# Patient Record
Sex: FEMALE | Race: BLACK OR AFRICAN AMERICAN | NOT HISPANIC OR LATINO | Employment: FULL TIME | URBAN - METROPOLITAN AREA
[De-identification: names, ages, dates, MRNs, and addresses within clinical notes are randomized per-mention and may not be internally consistent; named-entity substitution may affect disease eponyms.]

---

## 2022-01-11 ENCOUNTER — LAB VISIT (OUTPATIENT)
Dept: LAB | Facility: HOSPITAL | Age: 27
End: 2022-01-11
Attending: SPECIALIST
Payer: MEDICAID

## 2022-01-11 ENCOUNTER — INITIAL PRENATAL (OUTPATIENT)
Dept: OBSTETRICS AND GYNECOLOGY | Facility: CLINIC | Age: 27
End: 2022-01-11
Payer: MEDICAID

## 2022-01-11 VITALS
BODY MASS INDEX: 20.23 KG/M2 | DIASTOLIC BLOOD PRESSURE: 64 MMHG | WEIGHT: 114.19 LBS | SYSTOLIC BLOOD PRESSURE: 108 MMHG

## 2022-01-11 DIAGNOSIS — Z34.90 PREGNANCY, UNSPECIFIED GESTATIONAL AGE: Primary | ICD-10-CM

## 2022-01-11 DIAGNOSIS — Z3A.18 18 WEEKS GESTATION OF PREGNANCY: ICD-10-CM

## 2022-01-11 LAB
B-HCG UR QL: POSITIVE
BASOPHILS # BLD AUTO: 0.02 K/UL (ref 0–0.2)
BASOPHILS NFR BLD: 0.3 % (ref 0–1.9)
BILIRUB SERPL-MCNC: NORMAL MG/DL
BLOOD URINE, POC: NORMAL
CLARITY, POC UA: CLEAR
COLOR, POC UA: YELLOW
CTP QC/QA: YES
DIFFERENTIAL METHOD: NORMAL
EOSINOPHIL # BLD AUTO: 0.1 K/UL (ref 0–0.5)
EOSINOPHIL NFR BLD: 2.1 % (ref 0–8)
ERYTHROCYTE [DISTWIDTH] IN BLOOD BY AUTOMATED COUNT: 12.4 % (ref 11.5–14.5)
GLUCOSE UR QL STRIP: NORMAL
HCT VFR BLD AUTO: 40.8 % (ref 37–48.5)
HGB BLD-MCNC: 13.4 G/DL (ref 12–16)
IMM GRANULOCYTES # BLD AUTO: 0.02 K/UL (ref 0–0.04)
IMM GRANULOCYTES NFR BLD AUTO: 0.3 % (ref 0–0.5)
KETONES UR QL STRIP: NORMAL
LEUKOCYTE ESTERASE URINE, POC: NORMAL
LYMPHOCYTES # BLD AUTO: 2.1 K/UL (ref 1–4.8)
LYMPHOCYTES NFR BLD: 36.1 % (ref 18–48)
MCH RBC QN AUTO: 30.2 PG (ref 27–31)
MCHC RBC AUTO-ENTMCNC: 32.8 G/DL (ref 32–36)
MCV RBC AUTO: 92 FL (ref 82–98)
MONOCYTES # BLD AUTO: 0.5 K/UL (ref 0.3–1)
MONOCYTES NFR BLD: 8.6 % (ref 4–15)
NEUTROPHILS # BLD AUTO: 3 K/UL (ref 1.8–7.7)
NEUTROPHILS NFR BLD: 52.6 % (ref 38–73)
NITRITE, POC UA: NORMAL
NRBC BLD-RTO: 0 /100 WBC
PH, POC UA: 7
PLATELET # BLD AUTO: 273 K/UL (ref 150–450)
PMV BLD AUTO: 11.9 FL (ref 9.2–12.9)
PROTEIN, POC: NORMAL
RBC # BLD AUTO: 4.44 M/UL (ref 4–5.4)
SPECIFIC GRAVITY, POC UA: NORMAL
TSH SERPL DL<=0.005 MIU/L-ACNC: 0.61 UIU/ML (ref 0.4–4)
UROBILINOGEN, POC UA: NORMAL
WBC # BLD AUTO: 5.79 K/UL (ref 3.9–12.7)

## 2022-01-11 PROCEDURE — 81025 URINE PREGNANCY TEST: CPT | Mod: PBBFAC,PN | Performed by: SPECIALIST

## 2022-01-11 PROCEDURE — 99999 PR PBB SHADOW E&M-EST. PATIENT-LVL III: ICD-10-PCS | Mod: PBBFAC,,, | Performed by: SPECIALIST

## 2022-01-11 PROCEDURE — 86762 RUBELLA ANTIBODY: CPT | Performed by: SPECIALIST

## 2022-01-11 PROCEDURE — 76805 OB US >/= 14 WKS SNGL FETUS: CPT | Mod: 26,S$PBB,, | Performed by: SPECIALIST

## 2022-01-11 PROCEDURE — 88175 CYTOPATH C/V AUTO FLUID REDO: CPT | Performed by: PATHOLOGY

## 2022-01-11 PROCEDURE — 99205 OFFICE O/P NEW HI 60 MIN: CPT | Mod: 25,TH,S$PBB, | Performed by: SPECIALIST

## 2022-01-11 PROCEDURE — 36415 COLL VENOUS BLD VENIPUNCTURE: CPT | Mod: PN | Performed by: SPECIALIST

## 2022-01-11 PROCEDURE — 88141 CYTOPATH C/V INTERPRET: CPT | Mod: ,,, | Performed by: PATHOLOGY

## 2022-01-11 PROCEDURE — 76805 PR US, OB 14+WKS, TRANSABD, SINGLE GESTATION: ICD-10-PCS | Mod: 26,S$PBB,, | Performed by: SPECIALIST

## 2022-01-11 PROCEDURE — 86592 SYPHILIS TEST NON-TREP QUAL: CPT | Performed by: SPECIALIST

## 2022-01-11 PROCEDURE — 76805 OB US >/= 14 WKS SNGL FETUS: CPT | Mod: PBBFAC,PN | Performed by: SPECIALIST

## 2022-01-11 PROCEDURE — 99213 OFFICE O/P EST LOW 20 MIN: CPT | Mod: PBBFAC,TH,PN | Performed by: SPECIALIST

## 2022-01-11 PROCEDURE — 85025 COMPLETE CBC W/AUTO DIFF WBC: CPT | Performed by: SPECIALIST

## 2022-01-11 PROCEDURE — 87624 HPV HI-RISK TYP POOLED RSLT: CPT | Performed by: SPECIALIST

## 2022-01-11 PROCEDURE — 87340 HEPATITIS B SURFACE AG IA: CPT | Performed by: SPECIALIST

## 2022-01-11 PROCEDURE — 81220 CFTR GENE COM VARIANTS: CPT | Performed by: SPECIALIST

## 2022-01-11 PROCEDURE — 87591 N.GONORRHOEAE DNA AMP PROB: CPT | Performed by: SPECIALIST

## 2022-01-11 PROCEDURE — 86901 BLOOD TYPING SEROLOGIC RH(D): CPT | Performed by: SPECIALIST

## 2022-01-11 PROCEDURE — 99999 PR PBB SHADOW E&M-EST. PATIENT-LVL III: CPT | Mod: PBBFAC,,, | Performed by: SPECIALIST

## 2022-01-11 PROCEDURE — 76810 OB US >/= 14 WKS ADDL FETUS: CPT | Mod: PBBFAC,PN | Performed by: SPECIALIST

## 2022-01-11 PROCEDURE — 81002 URINALYSIS NONAUTO W/O SCOPE: CPT | Mod: PBBFAC,PN | Performed by: SPECIALIST

## 2022-01-11 PROCEDURE — 87389 HIV-1 AG W/HIV-1&-2 AB AG IA: CPT | Performed by: SPECIALIST

## 2022-01-11 PROCEDURE — 87491 CHLMYD TRACH DNA AMP PROBE: CPT | Performed by: SPECIALIST

## 2022-01-11 PROCEDURE — 84443 ASSAY THYROID STIM HORMONE: CPT | Performed by: SPECIALIST

## 2022-01-11 PROCEDURE — 88141 PR  CYTOPATH CERV/VAG INTERPRET: ICD-10-PCS | Mod: ,,, | Performed by: PATHOLOGY

## 2022-01-11 PROCEDURE — 99205 PR OFFICE/OUTPT VISIT, NEW, LEVL V, 60-74 MIN: ICD-10-PCS | Mod: 25,TH,S$PBB, | Performed by: SPECIALIST

## 2022-01-12 LAB
ABO + RH BLD: NORMAL
BLD GP AB SCN CELLS X3 SERPL QL: NORMAL
HBV SURFACE AG SERPL QL IA: NEGATIVE
HIV 1+2 AB+HIV1 P24 AG SERPL QL IA: NEGATIVE
RPR SER QL: NORMAL
RUBV IGG SER-ACNC: 22.6 IU/ML
RUBV IGG SER-IMP: REACTIVE

## 2022-01-12 NOTE — PROGRESS NOTES
25 yo BF  presents for late initial prenatal care  Positive quickening  Unremarkable prenatal past history    Past Medical History:   Diagnosis Date    Abnormal Pap smear of cervix        History reviewed. No pertinent surgical history.    Family History   Problem Relation Age of Onset    Diabetes Maternal Grandmother     Diabetes Maternal Grandfather     Hypertension Maternal Grandfather     Stroke Maternal Grandfather     Hypertension Mother     Breast cancer Neg Hx     Cancer Neg Hx     Colon cancer Neg Hx     Eclampsia Neg Hx     Miscarriages / Stillbirths Neg Hx     Ovarian cancer Neg Hx      labor Neg Hx        Social History     Socioeconomic History    Marital status: Single   Tobacco Use    Smoking status: Never Smoker    Smokeless tobacco: Never Used   Substance and Sexual Activity    Alcohol use: Never    Drug use: Never    Sexual activity: Yes     Partners: Male       Current Outpatient Medications   Medication Sig Dispense Refill    prenatal 25/iron/folate 6/dha (PRENA1 ORAL) Take by mouth.      ondansetron (ZOFRAN-ODT) 4 MG TbDL Take 1 tablet (4 mg total) by mouth every 8 (eight) hours as needed (nausea). (Patient not taking: Reported on 2022) 30 tablet 0     No current facility-administered medications for this visit.       Review of patient's allergies indicates:  No Known Allergies    Review of System:   General: no chills, fever, night sweats, weight gain or weight loss  Psychological: no depression or suicidal ideation  Breasts: no new or changing breast lumps, nipple discharge or masses.  Respiratory: no cough, shortness of breath, or wheezing  Cardiovascular: no chest pain or dyspnea on exertion  Gastrointestinal: no abdominal pain, change in bowel habits, or black or bloody stools  Genito-Urinary: no incontinence, urinary frequency/urgency or vulvar/vaginal symptoms, pelvic pain or abnormal vaginal bleeding.  Musculoskeletal: no gait disturbance or  muscular weakness                                              General Appearance    A and O x 4, Cooperative, no distress   Breasts    Abdomen   Deferred  Soft, non-tender, bowel sounds active all four quadrants,  Gravid with FH 18cm, no organomegaly    Genitourinary:   External rectal exam shows no thrombosed external hemorrhoids.   Pelvic exam was performed with patient supine.  No labial fusion.  There is no rash, lesion or injury on the right labia.  There is no rash, lesion or injury on the left labia.  No bleeding and no signs of injury around the vaginal introitus, urethra is without lesions and well supported. The cervix is visualized with no discharge, lesions or friability.  No vaginal discharge found.  No significant Cystocele, Enterocele or rectocele, and uterus well supported.  Bimanual exam:  deferred   Extremities: Extremities normal, atraumatic, no cyanosis or edema                   Procedure  Abd RTS 3.5MHz Positive IUP Single vertex , very active TERESA 14 Grade 1 placenta post lat EGA 18ws d EDC 6.8.22    Prenatal care plan discussed  Will obtain PNP QUAD screen  RTO 4 weeks  Answered all questions  NOTE  NURSING PERSONAL PRESENT FOR ENTIRE PHYSICAL EXAM

## 2022-01-14 ENCOUNTER — HOSPITAL ENCOUNTER (OUTPATIENT)
Dept: RADIOLOGY | Facility: HOSPITAL | Age: 27
Discharge: HOME | End: 2022-01-14
Attending: SPECIALIST
Payer: MEDICAID

## 2022-01-14 DIAGNOSIS — Z3A.18 18 WEEKS GESTATION OF PREGNANCY: ICD-10-CM

## 2022-01-14 PROCEDURE — 76805 OB US >/= 14 WKS SNGL FETUS: CPT | Mod: 26,,, | Performed by: RADIOLOGY

## 2022-01-14 PROCEDURE — 76805 US OB 14+ WEEKS, TRANSABDOM, SINGLE GESTATION: ICD-10-PCS | Mod: 26,,, | Performed by: RADIOLOGY

## 2022-01-14 PROCEDURE — 76805 OB US >/= 14 WKS SNGL FETUS: CPT | Mod: TC,PN

## 2022-01-17 LAB
C TRACH DNA SPEC QL NAA+PROBE: NOT DETECTED
N GONORRHOEA DNA SPEC QL NAA+PROBE: NOT DETECTED

## 2022-01-19 LAB
FINAL PATHOLOGIC DIAGNOSIS: ABNORMAL
Lab: ABNORMAL

## 2022-01-20 LAB
HPV HR 12 DNA SPEC QL NAA+PROBE: POSITIVE
HPV16 AG SPEC QL: NEGATIVE
HPV18 DNA SPEC QL NAA+PROBE: NEGATIVE

## 2022-01-26 LAB — CFTR MUT ANL BLD/T: NORMAL

## 2022-02-08 ENCOUNTER — ROUTINE PRENATAL (OUTPATIENT)
Dept: OBSTETRICS AND GYNECOLOGY | Facility: CLINIC | Age: 27
End: 2022-02-08
Payer: MEDICAID

## 2022-02-08 ENCOUNTER — LAB VISIT (OUTPATIENT)
Dept: LAB | Facility: HOSPITAL | Age: 27
End: 2022-02-08
Attending: SPECIALIST
Payer: MEDICAID

## 2022-02-08 VITALS
WEIGHT: 118.81 LBS | DIASTOLIC BLOOD PRESSURE: 80 MMHG | BODY MASS INDEX: 21.05 KG/M2 | SYSTOLIC BLOOD PRESSURE: 112 MMHG

## 2022-02-08 DIAGNOSIS — Z3A.22 22 WEEKS GESTATION OF PREGNANCY: ICD-10-CM

## 2022-02-08 DIAGNOSIS — Z13.79 GENETIC TESTING: ICD-10-CM

## 2022-02-08 DIAGNOSIS — Z3A.22 22 WEEKS GESTATION OF PREGNANCY: Primary | ICD-10-CM

## 2022-02-08 LAB
BILIRUB SERPL-MCNC: NORMAL MG/DL
BLOOD URINE, POC: NORMAL
CLARITY, POC UA: CLEAR
COLOR, POC UA: YELLOW
GLUCOSE UR QL STRIP: NORMAL
KETONES UR QL STRIP: NORMAL
LEUKOCYTE ESTERASE URINE, POC: NORMAL
MISCELLANEOUS GENETIC TEST NAME: NORMAL
NITRITE, POC UA: NORMAL
PH, POC UA: 6
PROTEIN, POC: NORMAL
SPECIFIC GRAVITY, POC UA: NORMAL
UROBILINOGEN, POC UA: NORMAL

## 2022-02-08 PROCEDURE — 99212 OFFICE O/P EST SF 10 MIN: CPT | Mod: PBBFAC,PN | Performed by: SPECIALIST

## 2022-02-08 PROCEDURE — 99999 PR PBB SHADOW E&M-EST. PATIENT-LVL II: CPT | Mod: PBBFAC,,, | Performed by: SPECIALIST

## 2022-02-08 PROCEDURE — 81002 URINALYSIS NONAUTO W/O SCOPE: CPT | Mod: PBBFAC,PN | Performed by: SPECIALIST

## 2022-02-08 PROCEDURE — 36415 COLL VENOUS BLD VENIPUNCTURE: CPT | Mod: PO | Performed by: SPECIALIST

## 2022-02-08 PROCEDURE — 99999 PR PBB SHADOW E&M-EST. PATIENT-LVL II: ICD-10-PCS | Mod: PBBFAC,,, | Performed by: SPECIALIST

## 2022-02-08 PROCEDURE — 99213 PR OFFICE/OUTPT VISIT, EST, LEVL III, 20-29 MIN: ICD-10-PCS | Mod: TH,S$PBB,, | Performed by: SPECIALIST

## 2022-02-08 PROCEDURE — 99213 OFFICE O/P EST LOW 20 MIN: CPT | Mod: TH,S$PBB,, | Performed by: SPECIALIST

## 2022-02-08 NOTE — PROGRESS NOTES
Complaints today: none , Good fetal movements reported No Bleeding or pains  Did not do recommended QUAD screen and thus rec CFDNA today  In addition, discussed GD screen on RTO      /80   Wt 53.9 kg (118 lb 13.3 oz)   BMI 21.05 kg/m²     26 y.o., at 22w6d by Estimated Date of Delivery: 22  There is no problem list on file for this patient.    OB History    Para Term  AB Living   3 2 2     2   SAB IAB Ectopic Multiple Live Births           2      # Outcome Date GA Lbr Mo/2nd Weight Sex Delivery Anes PTL Lv   3 Current            2 Term     F INDUCTION EPI N MILY   1 Term     F INDUCTION EPI N MILY       Dating reviewed    Allergies and problem list reviewed and updated    Medical and surgical history reviewed    Prenatal labs reviewed and updated    Physical Exam:  ABD: soft, gravid, nontender,     Assessment:  IUP 22 w 6 d    Plan:   MAt 21 today  follow up 4 Weeks, bleeding/pain precautions   kick counts, labor precautions

## 2022-02-23 ENCOUNTER — PATIENT MESSAGE (OUTPATIENT)
Dept: ADMINISTRATIVE | Facility: OTHER | Age: 27
End: 2022-02-23
Payer: MEDICAID

## 2022-03-08 ENCOUNTER — ROUTINE PRENATAL (OUTPATIENT)
Dept: OBSTETRICS AND GYNECOLOGY | Facility: CLINIC | Age: 27
End: 2022-03-08
Payer: MEDICAID

## 2022-03-08 ENCOUNTER — LAB VISIT (OUTPATIENT)
Dept: LAB | Facility: HOSPITAL | Age: 27
End: 2022-03-08
Attending: SPECIALIST
Payer: MEDICAID

## 2022-03-08 VITALS
DIASTOLIC BLOOD PRESSURE: 66 MMHG | BODY MASS INDEX: 22.03 KG/M2 | SYSTOLIC BLOOD PRESSURE: 112 MMHG | WEIGHT: 124.31 LBS

## 2022-03-08 DIAGNOSIS — Z3A.22 22 WEEKS GESTATION OF PREGNANCY: ICD-10-CM

## 2022-03-08 DIAGNOSIS — Z3A.26 26 WEEKS GESTATION OF PREGNANCY: Primary | ICD-10-CM

## 2022-03-08 LAB
BILIRUB SERPL-MCNC: NORMAL MG/DL
BLOOD URINE, POC: NORMAL
CLARITY, POC UA: NORMAL
COLOR, POC UA: NORMAL
GLUCOSE SERPL-MCNC: 71 MG/DL (ref 70–140)
GLUCOSE UR QL STRIP: NORMAL
KETONES UR QL STRIP: NORMAL
LEUKOCYTE ESTERASE URINE, POC: NORMAL
NITRITE, POC UA: NORMAL
PH, POC UA: 6
PROTEIN, POC: NORMAL
SPECIFIC GRAVITY, POC UA: NORMAL
UROBILINOGEN, POC UA: NORMAL

## 2022-03-08 PROCEDURE — 82950 GLUCOSE TEST: CPT | Performed by: SPECIALIST

## 2022-03-08 PROCEDURE — 99999 PR PBB SHADOW E&M-EST. PATIENT-LVL II: CPT | Mod: PBBFAC,,, | Performed by: SPECIALIST

## 2022-03-08 PROCEDURE — 99999 PR PBB SHADOW E&M-EST. PATIENT-LVL II: ICD-10-PCS | Mod: PBBFAC,,, | Performed by: SPECIALIST

## 2022-03-08 PROCEDURE — 99212 OFFICE O/P EST SF 10 MIN: CPT | Mod: PBBFAC,PN | Performed by: SPECIALIST

## 2022-03-08 PROCEDURE — 36415 COLL VENOUS BLD VENIPUNCTURE: CPT | Mod: PN | Performed by: SPECIALIST

## 2022-03-08 PROCEDURE — 81002 URINALYSIS NONAUTO W/O SCOPE: CPT | Mod: PBBFAC,PN | Performed by: SPECIALIST

## 2022-03-08 PROCEDURE — 99213 OFFICE O/P EST LOW 20 MIN: CPT | Mod: TH,S$PBB,, | Performed by: SPECIALIST

## 2022-03-08 PROCEDURE — 99213 PR OFFICE/OUTPT VISIT, EST, LEVL III, 20-29 MIN: ICD-10-PCS | Mod: TH,S$PBB,, | Performed by: SPECIALIST

## 2022-03-08 NOTE — PROGRESS NOTES
Complaints today:none  Completed Gd screen this am, Good fetal movements reported No Bleeding or pains    /66   Wt 56.4 kg (124 lb 5.4 oz)   BMI 22.03 kg/m²     26 y.o., at 26w6d by Estimated Date of Delivery: 22  There is no problem list on file for this patient.    OB History    Para Term  AB Living   3 2 2     2   SAB IAB Ectopic Multiple Live Births           2      # Outcome Date GA Lbr Mo/2nd Weight Sex Delivery Anes PTL Lv   3 Current            2 Term     F INDUCTION EPI N MILY   1 Term     F INDUCTION EPI N MILY       Dating reviewed    Allergies and problem list reviewed and updated    Medical and surgical history reviewed    Prenatal labs reviewed and updated    Physical Exam:  ABD: soft, gravid, nontender,     Assessment:IUP 27 weeks    Plan:   Review GD screen  Repeat u/s for fetal growth 34 weeks  follow up 2 Weeks, bleeding/pain precautions   kick counts, labor precautions

## 2022-03-28 ENCOUNTER — TELEPHONE (OUTPATIENT)
Dept: OBSTETRICS AND GYNECOLOGY | Facility: CLINIC | Age: 27
End: 2022-03-28

## 2022-03-28 NOTE — TELEPHONE ENCOUNTER
"----- Message from Naty Rodriguez sent at 3/28/2022  1:36 PM CDT -----  Regarding: appointment  "Type:  Patient Call Back    Who Called:GLO DIAZ    What is the reqeust in detail:pt requesting call back pt states she has a appointment schedule tomorrow for 1:20. I don't see anything for pt. Please advise    Can the clinic reply by [facility]? no    Best Call Back Number:[phone]      Additional Information:pt states appointment is on her mychart and is scheduled for 3/29. Pt would like a call back to confirm appointment. I just scheduled pt an appointment for 2pm tomorrow. So please confirm if pt has one for 1:20              "

## 2022-04-05 ENCOUNTER — ROUTINE PRENATAL (OUTPATIENT)
Dept: OBSTETRICS AND GYNECOLOGY | Facility: CLINIC | Age: 27
End: 2022-04-05
Payer: MEDICAID

## 2022-04-05 VITALS
WEIGHT: 131.63 LBS | BODY MASS INDEX: 23.31 KG/M2 | DIASTOLIC BLOOD PRESSURE: 60 MMHG | SYSTOLIC BLOOD PRESSURE: 110 MMHG

## 2022-04-05 DIAGNOSIS — Z3A.30 30 WEEKS GESTATION OF PREGNANCY: Primary | ICD-10-CM

## 2022-04-05 LAB
BILIRUB SERPL-MCNC: NORMAL MG/DL
BLOOD URINE, POC: NORMAL
CLARITY, POC UA: NORMAL
COLOR, POC UA: YELLOW
GLUCOSE UR QL STRIP: NORMAL
KETONES UR QL STRIP: NORMAL
LEUKOCYTE ESTERASE URINE, POC: NORMAL
NITRITE, POC UA: NORMAL
PH, POC UA: 7
PROTEIN, POC: NORMAL
SPECIFIC GRAVITY, POC UA: NORMAL
UROBILINOGEN, POC UA: NORMAL

## 2022-04-05 PROCEDURE — 99212 OFFICE O/P EST SF 10 MIN: CPT | Mod: PBBFAC,PN | Performed by: SPECIALIST

## 2022-04-05 PROCEDURE — 99213 PR OFFICE/OUTPT VISIT, EST, LEVL III, 20-29 MIN: ICD-10-PCS | Mod: TH,S$PBB,, | Performed by: SPECIALIST

## 2022-04-05 PROCEDURE — 99213 OFFICE O/P EST LOW 20 MIN: CPT | Mod: TH,S$PBB,, | Performed by: SPECIALIST

## 2022-04-05 PROCEDURE — 99999 PR PBB SHADOW E&M-EST. PATIENT-LVL II: CPT | Mod: PBBFAC,,, | Performed by: SPECIALIST

## 2022-04-05 PROCEDURE — 99999 PR PBB SHADOW E&M-EST. PATIENT-LVL II: ICD-10-PCS | Mod: PBBFAC,,, | Performed by: SPECIALIST

## 2022-04-05 PROCEDURE — 81002 URINALYSIS NONAUTO W/O SCOPE: CPT | Mod: PBBFAC,PN | Performed by: SPECIALIST

## 2022-04-05 NOTE — PROGRESS NOTES
Complaints today: none , Good fetal movements reported No Bleeding or pains    /60   Wt 59.7 kg (131 lb 9.8 oz)   BMI 23.31 kg/m²     26 y.o., at 30w6d by Estimated Date of Delivery: 22  There is no problem list on file for this patient.    OB History    Para Term  AB Living   3 2 2     2   SAB IAB Ectopic Multiple Live Births           2      # Outcome Date GA Lbr Mo/2nd Weight Sex Delivery Anes PTL Lv   3 Current            2 Term     F INDUCTION EPI N MILY   1 Term     F INDUCTION EPI N MILY       Dating reviewed    Allergies and problem list reviewed and updated    Medical and surgical history reviewed    Prenatal labs reviewed and updated    Physical Exam:  ABD: soft, gravid, nontender,     Assessment:  IUP 30w6d    Plan:     follow up 2 Weeks, bleeding/pain precautions  kick counts, labor precautions

## 2022-04-13 ENCOUNTER — PATIENT MESSAGE (OUTPATIENT)
Dept: ADMINISTRATIVE | Facility: OTHER | Age: 27
End: 2022-04-13
Payer: MEDICAID